# Patient Record
Sex: MALE | URBAN - METROPOLITAN AREA
[De-identification: names, ages, dates, MRNs, and addresses within clinical notes are randomized per-mention and may not be internally consistent; named-entity substitution may affect disease eponyms.]

---

## 2019-04-02 ENCOUNTER — APPOINTMENT (RX ONLY)
Dept: URBAN - METROPOLITAN AREA CLINIC 87 | Facility: CLINIC | Age: 39
Setting detail: DERMATOLOGY
End: 2019-04-02

## 2019-04-02 VITALS — HEIGHT: 73 IN | WEIGHT: 205 LBS

## 2019-04-02 DIAGNOSIS — L30.0 NUMMULAR DERMATITIS: ICD-10-CM

## 2019-04-02 PROCEDURE — 99213 OFFICE O/P EST LOW 20 MIN: CPT

## 2019-04-02 PROCEDURE — ? COUNSELING

## 2019-04-02 PROCEDURE — ? ADDITIONAL NOTES

## 2019-04-02 PROCEDURE — ? PRESCRIPTION

## 2019-04-02 RX ORDER — CLOBETASOL PROPIONATE 0.25 MG/G
CREAM TOPICAL
Qty: 1 | Refills: 2 | Status: ERX | COMMUNITY
Start: 2019-04-02

## 2019-04-02 RX ADMIN — CLOBETASOL PROPIONATE: 0.25 CREAM TOPICAL at 16:43

## 2019-04-02 ASSESSMENT — LOCATION SIMPLE DESCRIPTION DERM: LOCATION SIMPLE: ABDOMEN

## 2019-04-02 ASSESSMENT — LOCATION DETAILED DESCRIPTION DERM
LOCATION DETAILED: LEFT LATERAL ABDOMEN
LOCATION DETAILED: RIGHT LATERAL ABDOMEN
LOCATION DETAILED: EPIGASTRIC SKIN

## 2019-04-02 ASSESSMENT — LOCATION ZONE DERM: LOCATION ZONE: TRUNK

## 2019-04-02 NOTE — PROCEDURE: ADDITIONAL NOTES
Detail Level: Zone
Additional Notes: Patient traveled recently to cold weather area.\\nStart Impoyz twice daily and use moisturizer frequently.

## 2019-04-02 NOTE — HPI: RASH
What Type Of Note Output Would You Prefer (Optional)?: Standard Output
How Severe Is Your Rash?: moderate
Is This A New Presentation, Or A Follow-Up?: Rash
Additional History: Pt reports that he had traveled to [place] ago and he developed a rash on the L abdomen and L hand. Pt reports that he had used ketoconazole cream to the lesions but there has not been any improvement. The lesions are itchy. Pt is not sure if there were any particular exposures that triggered this rash.